# Patient Record
Sex: FEMALE | Race: WHITE | NOT HISPANIC OR LATINO
[De-identification: names, ages, dates, MRNs, and addresses within clinical notes are randomized per-mention and may not be internally consistent; named-entity substitution may affect disease eponyms.]

---

## 2017-11-30 ENCOUNTER — RESULT REVIEW (OUTPATIENT)
Age: 52
End: 2017-11-30

## 2019-04-09 ENCOUNTER — RECORD ABSTRACTING (OUTPATIENT)
Age: 54
End: 2019-04-09

## 2019-04-09 DIAGNOSIS — Z87.42 PERSONAL HISTORY OF OTHER DISEASES OF THE FEMALE GENITAL TRACT: ICD-10-CM

## 2019-04-09 DIAGNOSIS — Z80.8 FAMILY HISTORY OF MALIGNANT NEOPLASM OF OTHER ORGANS OR SYSTEMS: ICD-10-CM

## 2019-04-09 DIAGNOSIS — M85.80 OTHER SPECIFIED DISORDERS OF BONE DENSITY AND STRUCTURE, UNSPECIFIED SITE: ICD-10-CM

## 2019-04-09 DIAGNOSIS — N95.1 MENOPAUSAL AND FEMALE CLIMACTERIC STATES: ICD-10-CM

## 2019-04-09 DIAGNOSIS — Z82.62 FAMILY HISTORY OF OSTEOPOROSIS: ICD-10-CM

## 2019-04-09 LAB
CYTOLOGY CVX/VAG DOC THIN PREP: NORMAL
CYTOLOGY CVX/VAG DOC THIN PREP: NORMAL

## 2019-04-11 ENCOUNTER — APPOINTMENT (OUTPATIENT)
Dept: OBGYN | Facility: CLINIC | Age: 54
End: 2019-04-11
Payer: COMMERCIAL

## 2019-04-11 VITALS
SYSTOLIC BLOOD PRESSURE: 124 MMHG | WEIGHT: 143 LBS | DIASTOLIC BLOOD PRESSURE: 82 MMHG | HEIGHT: 64 IN | BODY MASS INDEX: 24.41 KG/M2

## 2019-04-11 DIAGNOSIS — R23.2 FLUSHING: ICD-10-CM

## 2019-04-11 DIAGNOSIS — Z83.3 FAMILY HISTORY OF DIABETES MELLITUS: ICD-10-CM

## 2019-04-11 DIAGNOSIS — Z01.419 ENCOUNTER FOR GYNECOLOGICAL EXAMINATION (GENERAL) (ROUTINE) W/OUT ABNORMAL FINDINGS: ICD-10-CM

## 2019-04-11 DIAGNOSIS — Z87.42 PERSONAL HISTORY OF OTHER DISEASES OF THE FEMALE GENITAL TRACT: ICD-10-CM

## 2019-04-11 DIAGNOSIS — Z80.3 FAMILY HISTORY OF MALIGNANT NEOPLASM OF BREAST: ICD-10-CM

## 2019-04-11 DIAGNOSIS — Z12.11 ENCOUNTER FOR SCREENING FOR MALIGNANT NEOPLASM OF COLON: ICD-10-CM

## 2019-04-11 DIAGNOSIS — Z12.4 ENCOUNTER FOR SCREENING FOR MALIGNANT NEOPLASM OF CERVIX: ICD-10-CM

## 2019-04-11 DIAGNOSIS — R92.2 INCONCLUSIVE MAMMOGRAM: ICD-10-CM

## 2019-04-11 DIAGNOSIS — Z82.49 FAMILY HISTORY OF ISCHEMIC HEART DISEASE AND OTHER DISEASES OF THE CIRCULATORY SYSTEM: ICD-10-CM

## 2019-04-11 DIAGNOSIS — Z78.9 OTHER SPECIFIED HEALTH STATUS: ICD-10-CM

## 2019-04-11 LAB
BILIRUB UR QL STRIP: NORMAL
CLARITY UR: CLEAR
COLLECTION METHOD: NORMAL
DATE COLLECTED: NORMAL
GLUCOSE UR-MCNC: NORMAL
HCG UR QL: 0.2 EU/DL
HEMOCCULT SP1 STL QL: NEGATIVE
HGB UR QL STRIP.AUTO: NORMAL
KETONES UR-MCNC: NORMAL
LEUKOCYTE ESTERASE UR QL STRIP: ABNORMAL
NITRITE UR QL STRIP: NORMAL
PH UR STRIP: 5.5
PROT UR STRIP-MCNC: NORMAL
QUALITY CONTROL: YES
SP GR UR STRIP: <=1.005

## 2019-04-11 PROCEDURE — 81003 URINALYSIS AUTO W/O SCOPE: CPT | Mod: QW

## 2019-04-11 PROCEDURE — 82270 OCCULT BLOOD FECES: CPT

## 2019-04-11 PROCEDURE — 99396 PREV VISIT EST AGE 40-64: CPT

## 2019-04-11 RX ORDER — LEVOTHYROXINE SODIUM 75 UG/1
75 TABLET ORAL
Refills: 0 | Status: ACTIVE | COMMUNITY

## 2019-04-11 NOTE — HISTORY OF PRESENT ILLNESS
[___ Year(s) Ago] : [unfilled] year(s) ago [Good] : being in good health [Healthy Diet] : a healthy diet [Regular Exercise] : regular exercise [Last Bone Density ___] : Last bone density studies [unfilled] [Last Pap ___] : Last cervical pap smear was [unfilled] [Last Pap Smear ___] : last Papanicolaou cytology done [unfilled] [Last Mammogram ___] : last mammogram done [unfilled] [Postmenopausal] : is postmenopausal [Last Colonoscopy ___] : last colonoscopy done [unfilled] [Pregnancy History] : pregnancy history: [Total Preg ___] : [unfilled] [Living ___] : [unfilled] [Menarche Age: ____] : age at menarche was [unfilled] [Currently In Menopause] : currently in menopause [Sexually Active] : is sexually active [Monogamous] : is monogamous [Male ___] : [unfilled] male [No] : no [Definite:  ___ (Date)] : the last menstrual period was [unfilled] [Menopause Age: ____] : age at menopause was [unfilled] [Menstrual Problems] : reports normal menses [[id number]] : None [de-identified] : Breast US 4/30/2018 [Burning] : no burning [Mass] : no mass [Stinging] : no stinging [Lesion] : no lesion [Soreness] : no soreness [Discharge] : no discharge [Irregular Menses] : normal menses [Prolonged Menses] : menses of normal duration [Localized Pain] : no localized pain [Diffused Pain] : no diffused pain [Mass (___cm)] : no palpable mass [Skin Color Change] : no skin color change [Nipple Discharge] : no nipple discharge [Hot Flashes] : no hot flashes [Mood Changes] : no mood changes [Night Sweats] : no night sweats [Vaginal Itching] : no vaginal itching [Contraception] : does not use contraception

## 2019-04-11 NOTE — PROCEDURE
[Liquid Base] : liquid base [Cervical Pap Smear] : cervical Pap smear [Tolerated Well] : the patient tolerated the procedure well [No Complications] : there were no complications

## 2019-04-11 NOTE — PHYSICAL EXAM
[Alert] : alert [Awake] : awake [Soft] : soft [Oriented x3] : oriented to person, place, and time [Vulvar Atrophy] : vulvar atrophy [Labia Majora] : labia major [Atrophy] : atrophy [Labia Minora] : labia minora [Normal] : clitoris [No Bleeding] : there was no active vaginal bleeding [Uterine Adnexae] : were not tender and not enlarged [No Tenderness] : no rectal tenderness [Nl Sphincter Tone] : normal sphincter tone [RRR, No Murmurs] : RRR, no murmurs [CTAB] : CTAB [Acute Distress] : no acute distress [Mass] : no breast mass [Nipple Discharge] : no nipple discharge [Axillary LAD] : no axillary lymphadenopathy [Tender] : non tender [Tenderness] : nontender [Enlarged ___ wks] : not enlarged [Mass ___ cm] : no uterine mass was palpated [Occult Blood] : occult blood test from digital rectal exam was negative

## 2019-04-12 LAB — HPV HIGH+LOW RISK DNA PNL CVX: NOT DETECTED

## 2019-04-15 LAB — CYTOLOGY CVX/VAG DOC THIN PREP: NORMAL

## 2020-01-04 NOTE — COUNSELING
[Breast Self Exam] : breast self exam [Nutrition] : nutrition [Vitamins/Supplements] : vitamins/supplements [Exercise] : exercise Patient

## 2020-12-21 PROBLEM — Z01.419 ENCOUNTER FOR ANNUAL ROUTINE GYNECOLOGICAL EXAMINATION: Status: RESOLVED | Noted: 2019-04-11 | Resolved: 2020-12-21
